# Patient Record
Sex: MALE | Race: WHITE | Employment: FULL TIME | URBAN - METROPOLITAN AREA
[De-identification: names, ages, dates, MRNs, and addresses within clinical notes are randomized per-mention and may not be internally consistent; named-entity substitution may affect disease eponyms.]

---

## 2018-07-13 ENCOUNTER — THERAPY VISIT (OUTPATIENT)
Dept: PHYSICAL THERAPY | Facility: CLINIC | Age: 33
End: 2018-07-13
Payer: OTHER MISCELLANEOUS

## 2018-07-13 DIAGNOSIS — M25.512 ACUTE PAIN OF LEFT SHOULDER: Primary | ICD-10-CM

## 2018-07-13 PROCEDURE — 97110 THERAPEUTIC EXERCISES: CPT | Mod: GP | Performed by: PHYSICAL THERAPIST

## 2018-07-13 PROCEDURE — 97161 PT EVAL LOW COMPLEX 20 MIN: CPT | Mod: GP | Performed by: PHYSICAL THERAPIST

## 2018-07-13 NOTE — PROGRESS NOTES
[facility] Initial Evaluation        Subjective:  Patient is a 32 year old male presenting with rehab left shoulder hpi.   Omid Lima is a 32 year old male with a left shoulder condition.  Condition occurred with:  Lifting.  Condition occurred: at work.  This is a new condition  6-18-18.    Patient reports pain:  Anterior.  Radiates to: none.  Pain is described as aching and is intermittent and reported as 1/10.  Associated with: none  Worse during: no longer having pain.  Symptoms are exacerbated by lifting and relieved by rest.  Since onset symptoms are rapidly improving.  Special testing: none.  Previous treatment: none.    General health as reported by patient is excellent.                                              Objective:  Standing Alignment:      Shoulder/UE:  Rounded shoulders                  Flexibility/Screens:   Positive screens:  Shoulder                             Shoulder Evaluation:  ROM:  AROM:    Flexion:  Left:  160    Right:  158    Abduction:  Left: 160   Right:  150    Internal Rotation:  Left:  85    Right:  85  External Rotation:  Left:  78    Right:  71                      Strength:    Flexion: Left:5/5   Pain:      Extension:  Left: 5/5    Pain:      Abduction:  Left: 5/5  Pain:        Internal Rotation:  Left:5/5     Pain:      External Rotation:   Left:5/5     Pain:             Stability Testing:  normal      Special Tests:      Left shoulder negative for the following special tests:  Labral; Impingement and Rotator cuff tear    Palpation:  normal      Mobility Tests:  normal                                                 General     ROS    Assessment/Plan:    Patient is a 32 year old male with left side shoulder complaints.    Patient has the following significant findings with corresponding treatment plan.                Diagnosis 1:  L shoulder strain  Pain -  self management and education  Decreased strength - therapeutic exercise and therapeutic  activities  Impaired muscle performance - neuro re-education  Decreased function - therapeutic activities    Therapy Evaluation Codes:   1) History comprised of:   Personal factors that impact the plan of care:      None.    Comorbidity factors that impact the plan of care are:      None.     Medications impacting care: None.  2) Examination of Body Systems comprised of:   Body structures and functions that impact the plan of care:      Shoulder.   Activity limitations that impact the plan of care are:      Lifting.  3) Clinical presentation characteristics are:   Stable/Uncomplicated.  4) Decision-Making    Low complexity using standardized patient assessment instrument and/or measureable assessment of functional outcome.  Cumulative Therapy Evaluation is: Low complexity.    Previous and current functional limitations:  (See Goal Flow Sheet for this information)    Short term and Long term goals: (See Goal Flow Sheet for this information)     Communication ability:  Patient appears to be able to clearly communicate and understand verbal and written communication and follow directions correctly.  Treatment Explanation - The following has been discussed with the patient:   RX ordered/plan of care  Anticipated outcomes  Possible risks and side effects  This patient would benefit from PT intervention to resume normal activities.   Rehab potential is excellent.    Frequency:  1 X week, once daily  Duration:  for 1 week  Discharge Plan:  Achieve all LTG.  Independent in home treatment program.  Reach maximal therapeutic benefit.    Patient is ready to return to normal work duties with no further PT visits necessary as he presents with no pain and normal ROM and strength.    Please refer to the daily flowsheet for treatment today, total treatment time and time spent performing 1:1 timed codes.

## 2018-07-13 NOTE — LETTER
[facility] PHYSICAL THERAPY   [address] [address]  [phone]    2018    Re: Omid Lima   :   1985  MRN:  [id number]   REFERRING PHYSICIAN:   Mark Chew    [facility] PHYSICAL [facility]  Date of Initial Evaluation:  18  Visits:  Rxs Used: 1  Reason for Referral:  Acute pain of left shoulder    EVALUATION SUMMARY    [facility] Initial Evaluation    Subjective:  Patient is a 32 year old male presenting with rehab left shoulder hpi.   Omid Lima is a 32 year old male with a left shoulder condition.  Condition occurred with:  Lifting.  Condition occurred: at work.  This is a new condition  18.    Patient reports pain:  Anterior.  Radiates to: none.  Pain is described as aching and is intermittent and reported as 1/10.  Associated with: none  Worse during: no longer having pain.  Symptoms are exacerbated by lifting and relieved by rest.  Since onset symptoms are rapidly improving.  Special testing: none.  Previous treatment: none.    General health as reported by patient is excellent.    Pertinent medical history includes:  Smoking.  Medical allergies: no.      Current occupation is / .    Primary job tasks include:  Lifting, driving and other (pushing and pulling).    Objective:  Standing Alignment:    Shoulder/UE:  Rounded shoulders    Flexibility/Screens:   Positive screens:  Shoulder    Shoulder Evaluation:  ROM:  AROM:    Flexion:  Left:  160    Right:  158  Abduction:  Left: 160   Right:  150  Internal Rotation:  Left:  85    Right:  85  External Rotation:  Left:  78    Right:  71    Strength:    Flexion: Left:5/5   Pain:      Extension:  Left: 5/5    Pain:      Abduction:  Left: 5/5  Pain:      Internal Rotation:  Left:5/5     Pain:      External Rotation:   Left:5/5     Pain:       Stability Testing:  normal      Re: Omid Lima     Special Tests:    Left  shoulder negative for the following special tests:  Labral; Impingement and Rotator cuff tear    Palpation:  normal    Mobility Tests:  normal    Assessment/Plan:    Patient is a 32 year old male with left side shoulder complaints.    Patient has the following significant findings with corresponding treatment plan.                Diagnosis 1:  L shoulder strain  Pain -  self management and education  Decreased strength - therapeutic exercise and therapeutic activities  Impaired muscle performance - neuro re-education  Decreased function - therapeutic activities    Therapy Evaluation Codes:   1) History comprised of:   Personal factors that impact the plan of care:      None.    Comorbidity factors that impact the plan of care are:      None.     Medications impacting care: None.  2) Examination of Body Systems comprised of:   Body structures and functions that impact the plan of care:      Shoulder.   Activity limitations that impact the plan of care are:      Lifting.  3) Clinical presentation characteristics are:   Stable/Uncomplicated.  4) Decision-Making    Low complexity using standardized patient assessment instrument and/or measureable assessment of functional outcome.  Cumulative Therapy Evaluation is: Low complexity.    Previous and current functional limitations:  (See Goal Flow Sheet for this information)    Short term and Long term goals: (See Goal Flow Sheet for this information)     Communication ability:  Patient appears to be able to clearly communicate and understand verbal and written communication and follow directions correctly.  Treatment Explanation - The following has been discussed with the patient:   RX ordered/plan of care  Anticipated outcomes  Possible risks and side effects  This patient would benefit from PT intervention to resume normal activities.   Rehab potential is excellent.    Frequency:  1 X week, once daily  Duration:  for 1 week  Discharge Plan:  Achieve all LTG.  Independent  in home treatment program.  Reach maximal therapeutic benefit.    Patient is ready to return to normal work duties with no further PT visits necessary as he presents with no pain and normal ROM and strength.      Re: Omid Lima     Please refer to the daily flowsheet for treatment today, total treatment time and time spent performing 1:1 timed codes.     Thank you for your referral.    INQUIRIES  Therapist: Georgiana Owusu, PT   [facility] PHYSICAL THERAPY  [address] [place] [address]  Phone: [phone]  Fax: [phone]

## 2018-07-13 NOTE — MR AVS SNAPSHOT
After Visit Summary   7/13/2018    Omid Lima    MRN: [id number]           Patient Information     Date Of Birth          1985        Visit Information        Provider Department      7/13/2018 8:10 AM Georgiana Owusu, PT [facility] Physical Therapy        Today's Diagnoses     Acute pain of left shoulder    -  1       Follow-ups after your visit        Who to contact     If you have questions or need follow up information about today's clinic visit or your schedule please contact [facility] PHYSICAL [facility] directly at [phone].  Normal or non-critical lab and imaging results will be communicated to you by MyChart, letter or phone within 4 business days after the clinic has received the results. If you do not hear from us within 7 days, please contact the clinic through MyChart or phone. If you have a critical or abnormal lab result, we will notify you by phone as soon as possible.  Submit refill requests through [employer] or call your pharmacy and they will forward the refill request to us. Please allow 3 business days for your refill to be completed.          Additional Information About Your Visit        Care EveryWhere ID     This is your Care EveryWhere ID. This could be used by other organizations to access your [facility] medical records  [id number]         Blood Pressure from Last 3 Encounters:   No data found for BP    Weight from Last 3 Encounters:   No data found for Wt              We Performed the Following     AL Inital Eval Report     PT Eval, Low Complexity (12310)     Therapeutic Exercises        Primary Care Provider Fax #    Physician No Ref-Primary [phone]       No address on file        Equal Access to Services     BETTY ACOSTA : Jose robles Soindia, waaxtenzin springer, qaybta kaalfelipa wu . [facility] [phone].    ATENCIÓN: Lorena antonio,  tiene a harrsion disposición servicios gratuitos de asistencia lingüística. Rina pacheco [phone].    We comply with applicable federal civil rights laws and [place] laws. We do not discriminate on the basis of race, color, national origin, age, disability, sex, sexual orientation, or gender identity.            Thank you!     Thank you for choosing [facility] PHYSICAL THERAPY  for your care. Our goal is always to provide you with excellent care. Hearing back from our patients is one way we can continue to improve our services. Please take a few minutes to complete the written survey that you may receive in the mail after your visit with us. Thank you!             Your Updated Medication List - Protect others around you: Learn how to safely use, store and throw away your medicines at www.disposemymeds.org.      Notice  As of 7/13/2018  2:24 PM    You have not been prescribed any medications.

## 2018-07-16 NOTE — PROGRESS NOTES
[facility] Initial Evaluation  Subjective:  Patient is a 32 year old male presenting with rehab left shoulder hpi.                                      Pertinent medical history includes:  Smoking.  Medical allergies: no.      Current occupation is / .    Primary job tasks include:  Lifting, driving and other (pushing and pulling).                                Objective:  System    Physical Exam    General     ROS    Assessment/Plan:

## 2021-06-01 ENCOUNTER — RECORDS - HEALTHEAST (OUTPATIENT)
Dept: ADMINISTRATIVE | Facility: CLINIC | Age: 36
End: 2021-06-01